# Patient Record
(demographics unavailable — no encounter records)

---

## 2024-10-24 NOTE — HISTORY OF PRESENT ILLNESS
[FreeTextEntry1] : Patient is a 74-year-old gentleman with recent stroke who presents to the cardiology clinic to establish care.  Patient was evaluated in the hospital after his stroke for an event monitor versus loop recorder, he was going to follow-up with us and decide.  Before he got discharged from the hospital he was found to have bladder cancer, likely causing thrombogenic risk factors.  He still has vision loss in the outer half of his right eye.  He otherwise denies any chest pain, shortness of breath, palpitation, orthopnea, PND, lower extremity edema.  He denies any nausea, vomiting.

## 2024-10-24 NOTE — RESULTS/DATA
[TextEntry] : Ct abdomen  IMPRESSION: 1.  2.6 cm RIGHT bladder base tumor. 2.  Enlarged prostate and secondary changes of chronic lower tract obstructive symptoms 3.  Urinary bladder and possible LEFT intrarenal calculi

## 2024-10-24 NOTE — ASSESSMENT
[FreeTextEntry1] : 74-year-old gentleman with recent stroke, found to have bladder cancer, likely the reason for his stroke given increased thrombogenesis that he is here for a cardiology evaluation posthospital discharge.  The patient is currently on apixaban as his anticoagulation, has an upcoming appointment with urology to discuss options for his bladder cancer.  Given that at this present time he needs to continue to be anticoagulated, normal left atrial size on his echocardiogram, will defer day event monitoring until his cancer has been treated.  Given stroke, he does need to be bridged with Lovenox given recent stroke.  Recent stroke Likely due to increased thrombogenesis he with bladder cancer. Continue Eliquis 5 mg twice daily, I have prescribed Lovenox as a bridging agent when he holds of on his apixaban until the night before his surgery.  Patient will be a low to intermediate risk for an intermediate risk procedure if he does undergo cystoscopy or bladder surgery.  Hyperlipidemia Continue with rosuvastatin 5 mg daily  I will see the patient back in clinic in 2 months, sooner if needed

## 2024-10-24 NOTE — PHYSICAL EXAM
[TextEntry] :  Physical Exam: General: Alert and oriented, No acute distress. Eye: Normal conjunctiva. HENMT: Oral mucosa is moist. Neck: No JVD Respiratory: Lungs are clear to auscultation, Respirations are non-labored, Breath sounds are equal, Symmetrical chest wall expansion. Cardiovascular: Regular rate, Normal rhythm, S1, S2 normal. No murmur. No edema Gastrointestinal: Soft, Non-tender, Non-distended, Normal bowel sounds Musculoskeletal: No chest wall tenderness. Integumentary: Warm, Dry, Pink Neurologic: No focal defects

## 2024-10-30 NOTE — PHYSICAL EXAM
[General Appearance - Well Developed] : well developed [General Appearance - Well Nourished] : well nourished [Edema] : no peripheral edema [] : no respiratory distress [Abdomen Soft] : soft [Urinary Bladder Findings] : the bladder was normal on palpation [Normal Station and Gait] : the gait and station were normal for the patient's age [No Focal Deficits] : no focal deficits [Oriented To Time, Place, And Person] : oriented to person, place, and time [No Palpable Adenopathy] : no palpable adenopathy

## 2024-10-30 NOTE — ASSESSMENT
[FreeTextEntry1] : plan TURBT cystolitholopaxy  Details regarding indications, risks, and benefits of the procedure were thoroughly explained to the patient. All images and labs were reviewed and explained thoroughly All the patient's questions were answered to the best of my ability.

## 2024-10-30 NOTE — HISTORY OF PRESENT ILLNESS
[FreeTextEntry1] : 75 yo male BPH CVA HL, newly found to have a bladder mass.  CT abd pelvis: 2.6 x 2 cm bladder mass right side of bladder with calcification. 6 mm bladder calculus enlarged prostate 6.3 cm in max transverse.  Fam Hx: negative  cancers / one sister with breast cancer blood thinners: Eliquis 5 mg BID  non-smoker

## 2024-10-30 NOTE — PHYSICAL EXAM
[FreeTextEntry1] : GENERAL APPEARANCE: Well developed, well nourished wo/man in no acute distress. CARDIOVASCULAR: Regular rate and rhythm    NEUROLOGIC EXAM: MENTAL STATUS: Alert and Oriented to person, place and time. Speech is fluent, without aphasia or dysarthria Behavior and affect appropriate to situation.                         CRANIAL NERVES: CN 2:    There is partial right upper and lower homonymous hemianopia (about 25% of far right lateral vision OU).  CN 3, 4, 6: Extraocular movements are intact. No nystagmus or ophthalmoplegia is evident. Pupils are equally round and reactive to light. CN 5:     Facial sensation is intact to light touch in all 3 divisions CN 7:     Facial excursion is full and symmetric bilaterally. MOTOR: Strength is 5/5 throughout for age and stature. No orbiting or drift noted. SENSORY: Intact to light touch perception in all four extremities without extinction to double simultaneous stimulation COORD: Finger to nose testing without dysmetria bilaterally. GAIT: Normal station and gait.

## 2024-10-30 NOTE — HISTORY OF PRESENT ILLNESS
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  He noticed that the right side of a television screen was much darker than the left and then noticed that the entire right side of his peripheral vision was missing compared to the left.  He was able to see most things in his central vision and the entire left side of the his vision.  He did not realize he was having a stroke at the time.  He was able to drive while in [place] without any issues.  He returned to the [place] and was instructed by ophthalmologist to report to the hospital for evaluation of possible stroke.   He was admitted to [facility] 10/11/2024 and discharged 10/13/2024.  There he underwent an MRI of the brain that demonstrated a left occipital acute subacute ischemic infarct.  The cervical and intracranial vessels demonstrate no severe stenosis or occlusion. Transthoracic echocardiogram demonstrated EF 55-60%.  Dilated ascending aorta measuring 4.0 cm, no PFO by agitated saline contrast injection, normal left atrial size.  Hemoglobin 11.2.    Triglyceride 143 Total cholesterol 207 HDL 53 TSH 1.68 A1c 5.4 AST 18 ALT 10

## 2024-10-30 NOTE — DISCUSSION/SUMMARY
[FreeTextEntry1] : 74-year-old man with thalassemia minor, dyslipidemia and new onset of partial right homonymous hemianopia that began early October 2024 (vacationing in [place]) with hospitalization upon return at [facility] October 11, 2024 found to have subacute ischemic infarct of the left occipital lobe and new diagnosis of bladder cancer.  Etiology of ischemic stroke is unknown.  Cerebral embolism is suspected given intracranial and cervical vessels are grossly patent without significant atherosclerosis.  Transthoracic echocardiogram is fairly unremarkable aside from dilated ascending aorta measuring 4.00 cm.  No PFO on bubble study.  Contributing factor is bladder cancer with the possibility of hypercoagulability from neoplasm.  Neurologically he has most of his vision and a portion of the right lateral upper and lower visual fields with persistent loss.  He was able to drive on the opposite side of the road [place] issues with these deficits.  He currently denies any significant limitations in his activities of daily living including reading and writing at this time to warrant any significant occupational therapy.  He has been anticoagulated on Eliquis since the discovery of his bladder cancer, for which he is going to undergo further testing and eventual surgical resection.  I agree with cardiologist to bridge the patient to Lovenox prior to surgical resection.  In regards to etiology of stroke and further evaluation, I do recommend post bladder surgery, if there is a plan to take the patient off of anticoagulation, then transition to aspirin 81 mg daily is reasonable, but I recommend continuous long-term cardiac monitoring with a loop recorder.  Recommendation: 1.  Continue Eliquis 5 mg twice daily for now 2.  Lovenox bridge prior to bladder surgery 3. There is no current national or international neurological society consensus or guidance about neurologic clearance prior to general anesthesia or for surgery. Given good functional recovery, there is felt to be no neurological contraindication for the patient to undergo general anesthesiology or surgery at this time. 4.  Postsurgery, patient should resume his antithrombotic therapy when surgically not contraindicated. 5.  Follow-up cardiology  Patient was educated about signs and symptoms of stroke and was counseled to contact 911 upon emergence of stroke-like symptoms. Intravenous thrombolysis and mechanical thrombectomy was also counseled and educated to the patient today.   Follow up 2-3 months.

## 2024-11-29 NOTE — HISTORY OF PRESENT ILLNESS
[FreeTextEntry1] : 75 yo male BPH CVA HL, newly found to have a bladder mass.  CT abd pelvis: 2.6 x 2 cm bladder mass right side of bladder with calcification. 6 mm bladder calculus enlarged prostate 6.3 cm in max transverse. Fam Hx: negative  cancers / one sister with breast cancer blood thinners: Eliquis 5 mg BID MRI prostate (Nov 2024): MS=813 cc / no lesions in prostate non-smoker Nov 29, 2024: s/p cystolitholopaxy + TURBT - path: Ta LG > 3 cm in size -- muscle present and uninvolved.  catheter removed today

## 2024-11-29 NOTE — ASSESSMENT
[FreeTextEntry1] : Nov 29, 2024: s/p cystolitholopaxy + TURBT - path: Ta LG > 3 cm in size -- muscle present and uninvolved.  catheter removed today  Plan continue tamsulosin 0.8 mg QHS Start finasteride 5 mg daily   induction gemcitabine x 6 cycles.  Details regarding indications, risks, and benefits of the procedure were thoroughly explained to the patient. All path results were reviewed and explained thoroughly All the patient's questions were answered to the best of my ability.

## 2024-12-18 NOTE — HISTORY OF PRESENT ILLNESS
[FreeTextEntry1] : Patient is a 74-year-old gentleman with recent stroke who presents to the cardiology clinic for follow up.  Patient was evaluated in the hospital after his stroke in 10/2024 for an event monitor versus loop recorder, he was going to follow-up with us and decide.  Before he got discharged from the hospital he was found to have bladder cancer, likely causing thrombogenic risk factors.  He still has vision loss in the outer half of his right eye.  He otherwise denies any chest pain, shortness of breath, palpitation, orthopnea, PND, lower extremity edema.  He denies any nausea, vomiting. He continues to do well otherwise, underwent tumor removal surgery and plan to start gemcitabine starting January 2025.

## 2024-12-18 NOTE — ASSESSMENT
[FreeTextEntry1] : 74-year-old gentleman with stroke 10/2024, found to have bladder cancer, likely the reason for his stroke given increased thrombogenesis that he is here for a cardiology follow up. He did undergo the tumor removal surgery in the interim and is planned to start 6 cycles of gemcitabine in 1/2025.   Ischemic stroke 10/2024 Likely due to increased thrombogenesis he with bladder cancer. Continue Eliquis 5 mg twice daily, plan to continue till he is in remission [place] does have a history of thalasemia minor, will refer to hemtology to ensure there is no other reason for him to be thrombogenic before stopping anticoagulation  Hyperlipidemia Patient had muscle aches and pains, plan to restart at a lower dose after chemotherapy is completed  I will see the patient back in clinic in 3 months, sooner if needed

## 2024-12-18 NOTE — RESULTS/DATA
[TextEntry] : Ct abdomen  IMPRESSION: 1.  2.6 cm RIGHT bladder base tumor. 2.  Enlarged prostate and secondary changes of chronic lower tract obstructive symptoms 3.  Urinary bladder and possible LEFT intrarenal calculi.  ONCLUSIONS:  1. Left ventricular cavity is normal in size. Left ventricular systolic function is normal with an ejection fraction visually estimated at 55 to 60 %. 2. Normal left ventricular diastolic function. 3. Calcification seen on the NCC. 4. Aortic root at the sinuses of Valsalva is dilated, measuring 4.20 cm (indexed 2.02 cm/m). Ascending aorta is dilated, measuring 4.00 cm (indexed 1.92 cm/m). 5. Mild to moderate mitral regurgitation. 6. Mild tricuspid regurgitation. 7. The inferior vena cava is normal in size measuring 1.71 cm in diameter, (normal <2.1cm) with normal inspiratory collapse (normal >50%) consistent with normal right atrial pressure ( R 3, range 0-5mmHg). 8. Estimated pulmonary artery systolic pressure is 25 mmHg, consistent with normal pulmonary artery pressure. 9. No echocardiographic evidence of vegetations. 10. Agitated saline injection was negative for intracardiac shunt.

## 2025-01-15 NOTE — DISCUSSION/SUMMARY
[FreeTextEntry1] : 74-year-old man with thalassemia minor, dyslipidemia and new onset of partial right homonymous hemianopia that began early October 2024 (vacationing in [place]) with hospitalization upon return at [facility] October 11, 2024 found to have subacute ischemic infarct of the left occipital lobe and new diagnosis of bladder cancer s/p surgical resection, now undergoing chemotherapy.   Etiology of ischemic stroke is unknown.  Cerebral embolism is suspected given intracranial and cervical vessels are grossly patent without significant atherosclerosis.  Transthoracic echocardiogram is fairly unremarkable aside from dilated ascending aorta measuring 4.00 cm.  No PFO on bubble study.  Contributing factor is bladder cancer with the possibility of hypercoagulability from neoplasm.  Neurologically he has most of his vision and a portion of the right lateral upper visual field (OU) with persistent loss.  He currently denies any significant limitations in his activities of daily living including reading and writing and daytime driving at this time to warrant any significant occupational therapy.  He has been anticoagulated on Eliquis since the discovery of his bladder cancer and would be reasonable to transition to aspirin after chemotherapy completed and patient deemed in remission/cured.   He wears apple watch for continue EKG monitoring. No afib reported yet.   Recommendation: 1.  Continue Eliquis 5 mg twice daily for now. May transition to aspirin daily post chemotherapy if okay from a cancer standpoint.  2.  continue apple watch cardiac monitoring.  3.  Follow-up cardiology  Patient was educated about signs and symptoms of stroke and was counseled to contact 911 upon emergence of stroke-like symptoms. Intravenous thrombolysis and mechanical thrombectomy was also counseled and educated to the patient today.   Follow up 4 months.

## 2025-01-15 NOTE — PHYSICAL EXAM
[FreeTextEntry1] : GENERAL APPEARANCE: Well developed, well nourished man in no acute distress. CARDIOVASCULAR: Regular rate and rhythm    NEUROLOGIC EXAM: MENTAL STATUS: Alert and Oriented to person, place and time. Speech is fluent, without aphasia or dysarthria Behavior and affect appropriate to situation.                         CRANIAL NERVES: CN 2:    There is partial right upper homonymous hemianopia (about 25% of far right lateral vision OU).  CN 3, 4, 6: Extraocular movements are intact. No nystagmus or ophthalmoplegia is evident. Pupils are equally round and reactive to light. CN 5:     Facial sensation is intact to light touch in all 3 divisions CN 7:     Facial excursion is full and symmetric bilaterally. MOTOR: Strength is 5/5 throughout for age and stature. No orbiting or drift noted. SENSORY: Intact to light touch perception in all four extremities without extinction to double simultaneous stimulation COORD: Finger to nose testing without dysmetria bilaterally. GAIT: Normal station and gait.

## 2025-04-17 NOTE — HISTORY OF PRESENT ILLNESS
[FreeTextEntry1] : Patient is a 74-year-old gentleman with recent stroke who presents to the cardiology clinic for follow up.  Patient was evaluated in the hospital after his stroke in 10/2024 for an event monitor versus loop recorder, he was going to follow-up with us and decide.  Before he got discharged from the hospital he was found to have bladder cancer, likely causing thrombogenic risk factors.  He still has vision loss in the outer half of his right eye.    He is still has the visual deficit but has adjusted to it.  He otherwise denies any chest pain, shortness of breath, orthopnea, PND.  From a bladder cancer standpoint he has completed 6 cycles of gemcitabine which was infused in his bladder.  He is technically in remission.

## 2025-04-17 NOTE — CARDIOLOGY SUMMARY
[de-identified] : 10/24/2024-sinus bradycardia 12/2024 - Inna sinus rhythm, normal ECG [de-identified] : 10/2024 1. Left ventricular cavity is normal in size. Left ventricular systolic function is normal with an ejection fraction visually estimated at 55 to 60 %. 2. Normal left ventricular diastolic function. 3. Calcification seen on the NCC. 4. Aortic root at the sinuses of Valsalva is dilated, measuring 4.20 cm (indexed 2.02 cm/m). Ascending aorta is dilated, measuring 4.00 cm (indexed 1.92 cm/m). 5. Mild to moderate mitral regurgitation. 6. Mild tricuspid regurgitation. 7. The inferior vena cava is normal in size measuring 1.71 cm in diameter, (normal <2.1cm) with normal inspiratory collapse (normal >50%) consistent with normal right atrial pressure (\R\3, range 0-5mmHg). 8. Estimated pulmonary artery systolic pressure is 25 mmHg, consistent with normal pulmonary artery pressure. 9. No echocardiographic evidence of vegetations. 10. Agitated saline injection was negative for intracardiac shunt.

## 2025-04-17 NOTE — ASSESSMENT
Pt called sinus symptoms continue she did complete the Z pack and continues with congestion along with headaches and green drainage. She tells me she has had this in the past and had to repeat the Z pack. Will resend.    [FreeTextEntry1] : 74-year-old gentleman with stroke 10/2024, found to have bladder cancer, likely the reason for his stroke given increased thrombogenesis that he is here for a cardiology follow up. He did undergo the tumor removal surgery in the interim and is completed 6 cycles of gemcitabine.  Given his stroke was thought secondary to active malignancy and him being in remission we will switch apixaban to aspirin 81 mg daily.  Patient was reviewed by neurology and they agreed with the plan to stop the apixaban when he was in remission.   Ischemic stroke 10/2024 Likely due to increased thrombogenesis he with bladder cancer. Will switch Eliquis to aspirin 81 mg daily I have messaged his urologist to confirm they are okay with that.  Hyperlipidemia Patient still has 3 cycles of prophylactic gemcitabine left, will discuss at the next visit with labs to see if he will benefit from a statin therapy.  I will see the patient back in clinic in 6 months, sooner if needed

## 2025-04-17 NOTE — RESULTS/DATA
[TextEntry] : Ct abdomen  IMPRESSION: 1.  2.6 cm RIGHT bladder base tumor. 2.  Enlarged prostate and secondary changes of chronic lower tract obstructive symptoms 3.  Urinary bladder and possible LEFT intrarenal calculi.  CONCLUSIONS:  1. Left ventricular cavity is normal in size. Left ventricular systolic function is normal with an ejection fraction visually estimated at 55 to 60 %. 2. Normal left ventricular diastolic function. 3. Calcification seen on the NCC. 4. Aortic root at the sinuses of Valsalva is dilated, measuring 4.20 cm (indexed 2.02 cm/m). Ascending aorta is dilated, measuring 4.00 cm (indexed 1.92 cm/m). 5. Mild to moderate mitral regurgitation. 6. Mild tricuspid regurgitation. 7. The inferior vena cava is normal in size measuring 1.71 cm in diameter, (normal <2.1cm) with normal inspiratory collapse (normal >50%) consistent with normal right atrial pressure ( R 3, range 0-5mmHg). 8. Estimated pulmonary artery systolic pressure is 25 mmHg, consistent with normal pulmonary artery pressure. 9. No echocardiographic evidence of vegetations. 10. Agitated saline injection was negative for intracardiac shunt.